# Patient Record
(demographics unavailable — no encounter records)

---

## 2024-11-06 NOTE — DISCUSSION/SUMMARY
[FreeTextEntry1] : 15 year old male presenting for vaccine Counseling. Behavioral health history was reviewed, and anticipatory guidance was provided  Vaccine Counseling -No CIR record found for patient -Patient to bring in vaccine records from home -VIS and consent provided for all required and recommended vaccines in [place] -Will administer as needed based on provided vaccine history  -QuantiFERON to be drawn at following visit along with any potentially needed titers based on history  Return in 1-2 weeks with vaccine history and signed vaccine consent

## 2024-11-06 NOTE — DISCUSSION/SUMMARY
[FreeTextEntry1] : 15 year old male presenting for vaccine Counseling. Behavioral health history was reviewed, and anticipatory guidance was provided  Vaccine Counseling -No CIR record found for patient -Patient to bring in vaccine records from home -VIS and consent provided for all required and recommended vaccines in [place] -Will administer as needed based on provided vaccine history  -QuantiFERON to be drawn at following visit along with any potentially needed titers based on history  Return in 1-2 weeks with vaccine history and signed vaccine consent

## 2024-11-06 NOTE — HISTORY OF PRESENT ILLNESS
[de-identified] : vaccine review [FreeTextEntry6] : 15-year-old male presenting for vaccine review  Patient reports he was born in [facility] but was living in [place] ago and traveled through multiple countries.  Patient reports [place] and has gotten about 6 vaccines at a refugee site they were at in [facility] before getting a hotel with his mother and sister. -Patient reports he has vaccine records of what was administered to him at home; unsure of having any vaccine records from childhood.  Denies any current complaints or concerns

## 2024-11-06 NOTE — RISK ASSESSMENT
[Has family members/adults to turn to for help] : has family members/adults to turn to for help [Grade: ____] : Grade: [unfilled] [Eats regular meals including adequate fruits and vegetables] : eats regular meals including adequate fruits and vegetables [Drinks non-sweetened liquids] : drinks non-sweetened liquids  [Has friends] : has friends [Home is free of violence] : home is free of violence [With Teen] : teen [Uses tobacco] : does not use tobacco [Uses drugs] : does not use drugs  [Drinks alcohol] : does not drink alcohol [Has/had oral sex] : has not had oral sex [Has had sexual intercourse] : has not had sexual intercourse [Gets depressed, anxious, or irritable/has mood swings] : does not get depressed, anxious, or irritable/has mood swings [Has thought about hurting self or considered suicide] : has not thought about hurting self or considered suicide [de-identified] : Lives with mother and sister- feels at home. Currently living in a hotel [de-identified] : [employer] [de-identified] : Living in a hotel and has the food that they provide. Reports his mother also works and buys whatever she can afford in addition. Eats 3 meals a day [de-identified] : no access to guns at home [de-identified] : attracted to girls

## 2024-11-06 NOTE — BEGINNING OF VISIT
[Patient] : patient [] :  [[employer] ] : provided by [employer]   [Time Spent: ____ minutes] : Total time spent using  services: [unfilled] minutes. The patient's primary language is not English thus required  services. [Interpreters_IDNumber] : [id number] [TWNoteComboBox1] : Fijian

## 2024-11-06 NOTE — BEGINNING OF VISIT
[Patient] : patient [] :  [[employer] ] : provided by [employer]   [Time Spent: ____ minutes] : Total time spent using  services: [unfilled] minutes. The patient's primary language is not English thus required  services. [Interpreters_IDNumber] : [id number] [TWNoteComboBox1] : Salvadorean

## 2024-11-06 NOTE — HISTORY OF PRESENT ILLNESS
[de-identified] : vaccine review [FreeTextEntry6] : 15-year-old male presenting for vaccine review  Patient reports he was born in [facility] but was living in [place] before [place] ago and traveled through multiple countries.  Patient reports he did not [place] any vaccines [place] border and has gotten about 6 vaccines at a refugee site they were at in [facility] before getting a hotel with his mother and sister. -Patient reports he has vaccine records of what was administered to him at home; unsure of having any vaccine records from childhood.  Denies any current complaints or concerns

## 2024-11-06 NOTE — RISK ASSESSMENT
[Has family members/adults to turn to for help] : has family members/adults to turn to for help [Grade: ____] : Grade: [unfilled] [Eats regular meals including adequate fruits and vegetables] : eats regular meals including adequate fruits and vegetables [Drinks non-sweetened liquids] : drinks non-sweetened liquids  [Has friends] : has friends [Home is free of violence] : home is free of violence [With Teen] : teen [Uses tobacco] : does not use tobacco [Uses drugs] : does not use drugs  [Drinks alcohol] : does not drink alcohol [Has/had oral sex] : has not had oral sex [Has had sexual intercourse] : has not had sexual intercourse [Gets depressed, anxious, or irritable/has mood swings] : does not get depressed, anxious, or irritable/has mood swings [Has thought about hurting self or considered suicide] : has not thought about hurting self or considered suicide [de-identified] : Lives with mother and sister- feels at home. Currently living in a hotel [de-identified] : [employer] [de-identified] : Living in a hotel and has the food that they provide. Reports his mother also works and buys whatever she can afford in addition. Eats 3 meals a day [de-identified] : no access to guns at home [de-identified] : attracted to girls